# Patient Record
Sex: MALE | Race: OTHER | Employment: UNEMPLOYED | URBAN - METROPOLITAN AREA
[De-identification: names, ages, dates, MRNs, and addresses within clinical notes are randomized per-mention and may not be internally consistent; named-entity substitution may affect disease eponyms.]

---

## 2019-09-03 ENCOUNTER — HOSPITAL ENCOUNTER (INPATIENT)
Age: 39
LOS: 3 days | Discharge: HOME | DRG: 885 | End: 2019-09-06
Attending: PSYCHIATRY & NEUROLOGY | Admitting: PSYCHIATRY & NEUROLOGY
Payer: COMMERCIAL

## 2019-09-03 PROBLEM — F32.9 MAJOR DEPRESSIVE DISORDER: Status: ACTIVE | Noted: 2019-09-03

## 2019-09-03 PROCEDURE — 74011250637 HC RX REV CODE- 250/637: Performed by: PSYCHIATRY & NEUROLOGY

## 2019-09-03 PROCEDURE — 65220000003 HC RM SEMIPRIVATE PSYCH

## 2019-09-03 RX ORDER — FOLIC ACID 1 MG/1
1 TABLET ORAL DAILY
Status: DISCONTINUED | OUTPATIENT
Start: 2019-09-03 | End: 2019-09-06 | Stop reason: HOSPADM

## 2019-09-03 RX ORDER — TRAZODONE HYDROCHLORIDE 50 MG/1
50 TABLET ORAL
Status: DISCONTINUED | OUTPATIENT
Start: 2019-09-03 | End: 2019-09-06 | Stop reason: HOSPADM

## 2019-09-03 RX ORDER — LANOLIN ALCOHOL/MO/W.PET/CERES
100 CREAM (GRAM) TOPICAL DAILY
Status: DISCONTINUED | OUTPATIENT
Start: 2019-09-03 | End: 2019-09-06 | Stop reason: HOSPADM

## 2019-09-03 RX ORDER — THERA TABS 400 MCG
1 TAB ORAL DAILY
Status: DISCONTINUED | OUTPATIENT
Start: 2019-09-03 | End: 2019-09-06 | Stop reason: HOSPADM

## 2019-09-03 RX ORDER — LORAZEPAM 2 MG/ML
1 INJECTION INTRAMUSCULAR
Status: DISCONTINUED | OUTPATIENT
Start: 2019-09-03 | End: 2019-09-06 | Stop reason: HOSPADM

## 2019-09-03 RX ORDER — ACETAMINOPHEN 325 MG/1
650 TABLET ORAL
Status: DISCONTINUED | OUTPATIENT
Start: 2019-09-03 | End: 2019-09-06 | Stop reason: HOSPADM

## 2019-09-03 RX ORDER — BENZTROPINE MESYLATE 1 MG/1
1 TABLET ORAL
Status: DISCONTINUED | OUTPATIENT
Start: 2019-09-03 | End: 2019-09-06 | Stop reason: HOSPADM

## 2019-09-03 RX ORDER — HYDROXYZINE 50 MG/1
50 TABLET, FILM COATED ORAL
Status: DISCONTINUED | OUTPATIENT
Start: 2019-09-03 | End: 2019-09-06 | Stop reason: HOSPADM

## 2019-09-03 RX ORDER — NALTREXONE HYDROCHLORIDE 50 MG/1
50 TABLET, FILM COATED ORAL DAILY
Status: DISCONTINUED | OUTPATIENT
Start: 2019-09-04 | End: 2019-09-03

## 2019-09-03 RX ORDER — DIPHENHYDRAMINE HYDROCHLORIDE 50 MG/ML
50 INJECTION, SOLUTION INTRAMUSCULAR; INTRAVENOUS
Status: DISCONTINUED | OUTPATIENT
Start: 2019-09-03 | End: 2019-09-06 | Stop reason: HOSPADM

## 2019-09-03 RX ORDER — HALOPERIDOL 5 MG/ML
5 INJECTION INTRAMUSCULAR
Status: DISCONTINUED | OUTPATIENT
Start: 2019-09-03 | End: 2019-09-06 | Stop reason: HOSPADM

## 2019-09-03 RX ORDER — ADHESIVE BANDAGE
30 BANDAGE TOPICAL DAILY PRN
Status: DISCONTINUED | OUTPATIENT
Start: 2019-09-03 | End: 2019-09-06 | Stop reason: HOSPADM

## 2019-09-03 RX ORDER — PHENOBARBITAL 32.4 MG/1
32.4 TABLET ORAL
Status: DISCONTINUED | OUTPATIENT
Start: 2019-09-03 | End: 2019-09-06 | Stop reason: HOSPADM

## 2019-09-03 RX ORDER — IBUPROFEN 400 MG/1
400 TABLET ORAL
Status: DISCONTINUED | OUTPATIENT
Start: 2019-09-03 | End: 2019-09-06 | Stop reason: HOSPADM

## 2019-09-03 RX ORDER — OLANZAPINE 5 MG/1
5 TABLET ORAL
Status: DISCONTINUED | OUTPATIENT
Start: 2019-09-03 | End: 2019-09-06 | Stop reason: HOSPADM

## 2019-09-03 RX ORDER — SERTRALINE HYDROCHLORIDE 50 MG/1
50 TABLET, FILM COATED ORAL DAILY
Status: DISCONTINUED | OUTPATIENT
Start: 2019-09-03 | End: 2019-09-06 | Stop reason: HOSPADM

## 2019-09-03 RX ORDER — PHENOBARBITAL 64.8 MG/1
64.8 TABLET ORAL 3 TIMES DAILY
Status: DISCONTINUED | OUTPATIENT
Start: 2019-09-03 | End: 2019-09-04

## 2019-09-03 RX ADMIN — Medication 100 MG: at 13:11

## 2019-09-03 RX ADMIN — PHENOBARBITAL 64.8 MG: 32.4 TABLET ORAL at 16:36

## 2019-09-03 RX ADMIN — PHENOBARBITAL 64.8 MG: 32.4 TABLET ORAL at 21:16

## 2019-09-03 RX ADMIN — SERTRALINE HYDROCHLORIDE 50 MG: 50 TABLET ORAL at 13:11

## 2019-09-03 RX ADMIN — PHENOBARBITAL 64.8 MG: 32.4 TABLET ORAL at 09:06

## 2019-09-03 RX ADMIN — FOLIC ACID 1 MG: 1 TABLET ORAL at 13:11

## 2019-09-03 RX ADMIN — THERA TABS 1 TABLET: TAB at 13:11

## 2019-09-03 NOTE — ROUTINE PROCESS
TRANSFER - IN REPORT:    Verbal report received from [facility]) on Hui Apodaca  being received from [facility] ED(unit) for routine progression of care      Report consisted of patients Situation, Background, Assessment and   Recommendations(SBAR). Information from the following report(s) SBAR was reviewed with the receiving nurse. Opportunity for questions and clarification was provided. Assessment completed upon patients arrival to unit and care assumed.

## 2019-09-03 NOTE — BH NOTES
Patient was given food and fluids prior to going to bed and was cooperative with admission process. Sleep hours were 3.0 at this time noted.
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
## 2019-09-03 NOTE — PROGRESS NOTES
Problem: Discharge Planning  Goal: *Discharge to safe environment  Outcome: Progressing Towards Goal  Note:   He is willing to go to the [facility] place shelter   Goal: *Knowledge of medication management  Outcome: Progressing Towards Goal  Note:   Willing to take medications   Goal: *Knowledge of discharge instructions  Outcome: Progressing Towards Goal  Note:   He is able to verbalize discharge instructions

## 2019-09-03 NOTE — PROGRESS NOTES
Admission Medication Reconciliation:    Information obtained from:  Patient interview  RxQuery data available¹:  NO    Comments/Recommendations: Updated PTA meds/reviewed patient's allergies. 1)  Patient denies taking any medications prior to admission. He reports that he never has been on any medications for mental health. 2)  Medication changes (since last review): none    3)  The [place] Prescription Monitoring Program () was assessed to determine fill history of any controlled medications. Unable to locate the patient in the database. ¹RxQuery pharmacy benefit data reflects medications filled and processed through the patient's insurance, however   this data does NOT capture whether the medication was picked up or is currently being taken by the patient. Allergies:  Patient has no known allergies. Significant PMH/Disease States: No past medical history on file. Chief Complaint for this Admission:  No chief complaint on file.     Prior to Admission Medications:   None     ZOHRA Vu

## 2019-09-03 NOTE — BH NOTES
Behavioral Health Interdisciplinary Rounds     Patient Name: Adria Mast  Age: 42 y.o. Room/Bed:  734/  Primary Diagnosis: <principal problem not specified>   Admission Status: TDO     Readmission within 30 days: no  Power of  in place: no  Patient requires a blocked bed: no          Reason for blocked bed:     VTE Prophylaxis: Not indicated    Mobility needs/Fall risk: no  Flu Vaccine : no   Nutritional Plan: no  Consults:          Labs/Testing due today?: admission labs   Sleep hours: new admission        Participation in Care/Groups:    Medication Compliant?: Yes  PRNS (last 24 hours):     Restraints (last 24 hours):  no     CIWA (range last 24 hours): CIWA-Ar Total: 4   COWS (range last 24 hours):      Alcohol screening (AUDIT) completed -   AUDIT Score: 26     If applicable, date SBIRT discussed in treatment team AND documented:   AUDIT Screen Score: AUDIT Score: 26      Document Brief Intervention (corresponds directly with the 5 A's, Ask, Advise, Assess, Assist, and Arrange): At- Risk Patients (Score 7-15 for women; 8-15 for men)  Discuss concern patient is drinking at unhealthy levels known to increase risk of alcohol-related health problems. Is Patient ready to commit to change?  Reports he is wanting help     If Yes:   Set goal- stop drinking    Plan- he is willing to go to the [address] [place] [address] provided    Harmful use or Dependence (Score 16 or greater)   Discuss short term and long term health risks of consuming alcohol   Recommendations- stop drinking and consider a long term treatment program    Negotiate drinking goal- Stop drinking    Recommend addiction specialist/center- [facility]    Arrange follow-up appointments.- go to the [facility] place for intake after discharge     Tobacco - patient is a smoker: Have You Used Tobacco in the Past 30 Days: No  Illegal Drugs use: Have You Used Any Illegal Substances Over the Past 12 Months: No    24 hour chart check complete: new admission     Patient goal(s) for today: meet with treatment team   Treatment team focus/goals: Plan to set up his hearing and assess for medications and discharge needs  Progress note : He was pleasant with treatment team.  Willing to get help     LOS:  0  Expected LOS: TBD    Financial concerns/prescription coverage:  No benefits   Date of last family contact:      Family requesting physician contact today:   Discharge plan: homeless   Guns in the home: no       Outpatient provider(s): [facility]     Participating treatment team members: Ty Joy Dr., RN - Sonja Melendez,PharmD.
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
## 2019-09-03 NOTE — PROGRESS NOTES
Problem: Alcohol Withdrawal  Goal: *STG: Seeks staff when symptoms of withdrawal increase  Outcome: Progressing Towards Goal    Patient remains isolative to room. Responds to questions by HOSP [facility] DR JOANNA VARELA. Indicates he feels better. Denies SI. Goal: *STG: Complies with medication therapy  Outcome: Progressing Towards Goal  Patient medication compliant.

## 2019-09-03 NOTE — BH NOTES
Telephone call to hospitalist on call, Ward Kothari NP for [facility], confirmed. Patient spoken to by Daryl Soto. Laying in bed, does not desire to get up to eat. Reports slight anxiety, reduced from one hour ago, \"feeling better\" reports slight headache, feelings of agitation. Reports that he does not take any medication outside of the hospital. Medication compliant.

## 2019-09-03 NOTE — BH NOTES
[facility] 60  Master Treatment Plan for Keny Cummins    Date Treatment Plan Initiated: 9/3/2019    Treatment Plan Modalities:  Type of Modality Amount  (x minutes) Frequency (x/week) Duration (x days) Name of Responsible Staff   [address] [facility] meetings to encourage peer interactions 15 7 1 C HILL BHT     Group psychotherapy to assist in building coping skills and internal controls 60 7 1 Darin Kirkland   Therapeutic activity groups to build coping skills 60 7 1 Darin Kirkland   Psychoeducation in group setting to address:   Medication education   15 7 1 Tomy 77 RN   Relaxation techniques      SHANNAN Sellers RN   Symptom management      STAFF   Discharge planning   [facility]   60 1 1 volunteer   Recovery/AA/NA      volunteer   Physician medication management   15 7 1 DR SHANNAN ROMERO   Family meeting/discharge planning   15 2 [facility] and Kayla Richter

## 2019-09-03 NOTE — BH NOTES
GROUP THERAPY PROGRESS NOTE    Dufm Cockayne did not participate in an Acute Unit Process Group, with a focus on identifying feelings, planning for the rest of the day, and developing coping skills.

## 2019-09-03 NOTE — BH NOTES
Admission Note:    Patient admitted to [facility] acute unit    Admission Status: TDO [facility])    Reason for Admission: Pt was found at a gas station acting bizarre. A bystander called 911 and he was reportedly heavily intoxicated upon there arrival. Pt has SI with plan to lay in traffic. Pt guarded and was heavily intoxicated trying to leave, and was TDO'd.    UDS neg BAL was 372 then came down to 254    Pt's Condition on Arrival: Pt is guarded and defensive with irritable affect. Despite this, he still cooperated with admission process. He reports Greek is his first language but his english is still good. He currently denies SI/HI and verbally contracts for safety. His hygiene was less than adequate. Pt's Statements/Questions/Concerns: Pt is on CIWA monitoring with scheduled phenobarbital. Pt is homeless. Primary Nurse Sandra Amin and Honey Bacon RN performed a dual skin assessment on this patient Impairment noted- see wound doc flow sheet  Pt has scattered healed scars to back, knees, arms, and elbows that he is unaware of the origin. He also has a lot of self inflicted scars to inner left forearm that are healed and closed. Feet dry and malodorous. David score is 23    Belongings searched by Enriqueta secured properly. See orange slip in paper chart and flowsheet.

## 2019-09-03 NOTE — PROGRESS NOTES
Problem: Alcohol Withdrawal  Goal: *STG: Remains safe in hospital  Outcome: Progressing Towards Goal  Goal: *STG: Seeks staff when symptoms of withdrawal increase  Outcome: Progressing Towards Goal  Goal: *STG: Complies with medication therapy  Outcome: Progressing Towards Goal
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
## 2019-09-04 LAB
ALBUMIN SERPL-MCNC: 3.8 G/DL (ref 3.5–5)
ALBUMIN/GLOB SERPL: 1.1 {RATIO} (ref 1.1–2.2)
ALP SERPL-CCNC: 145 U/L (ref 45–117)
ALT SERPL-CCNC: 120 U/L (ref 12–78)
ANION GAP SERPL CALC-SCNC: 8 MMOL/L (ref 5–15)
AST SERPL-CCNC: 58 U/L (ref 15–37)
BILIRUB SERPL-MCNC: 2.1 MG/DL (ref 0.2–1)
BUN SERPL-MCNC: 8 MG/DL (ref 6–20)
BUN/CREAT SERPL: 10 (ref 12–20)
CALCIUM SERPL-MCNC: 9.1 MG/DL (ref 8.5–10.1)
CHLORIDE SERPL-SCNC: 102 MMOL/L (ref 97–108)
CHOLEST SERPL-MCNC: 181 MG/DL
CO2 SERPL-SCNC: 28 MMOL/L (ref 21–32)
CREAT SERPL-MCNC: 0.84 MG/DL (ref 0.7–1.3)
GLOBULIN SER CALC-MCNC: 3.6 G/DL (ref 2–4)
GLUCOSE P FAST SERPL-MCNC: 105 MG/DL (ref 65–100)
GLUCOSE SERPL-MCNC: 97 MG/DL (ref 65–100)
HDLC SERPL-MCNC: 75 MG/DL
HDLC SERPL: 2.4 {RATIO} (ref 0–5)
LDLC SERPL CALC-MCNC: 84.4 MG/DL (ref 0–100)
LIPID PROFILE,FLP: NORMAL
POTASSIUM SERPL-SCNC: 3.7 MMOL/L (ref 3.5–5.1)
PROT SERPL-MCNC: 7.4 G/DL (ref 6.4–8.2)
SODIUM SERPL-SCNC: 138 MMOL/L (ref 136–145)
TRIGL SERPL-MCNC: 108 MG/DL (ref ?–150)
TSH SERPL DL<=0.05 MIU/L-ACNC: 2.68 UIU/ML (ref 0.36–3.74)
VLDLC SERPL CALC-MCNC: 21.6 MG/DL

## 2019-09-04 PROCEDURE — 65220000003 HC RM SEMIPRIVATE PSYCH

## 2019-09-04 PROCEDURE — 36415 COLL VENOUS BLD VENIPUNCTURE: CPT

## 2019-09-04 PROCEDURE — 82947 ASSAY GLUCOSE BLOOD QUANT: CPT

## 2019-09-04 PROCEDURE — 80061 LIPID PANEL: CPT

## 2019-09-04 PROCEDURE — 74011250637 HC RX REV CODE- 250/637: Performed by: PSYCHIATRY & NEUROLOGY

## 2019-09-04 PROCEDURE — 84443 ASSAY THYROID STIM HORMONE: CPT

## 2019-09-04 PROCEDURE — 80053 COMPREHEN METABOLIC PANEL: CPT

## 2019-09-04 RX ORDER — PHENOBARBITAL 32.4 MG/1
48.6 TABLET ORAL 3 TIMES DAILY
Status: DISCONTINUED | OUTPATIENT
Start: 2019-09-04 | End: 2019-09-05

## 2019-09-04 RX ADMIN — PHENOBARBITAL 64.8 MG: 32.4 TABLET ORAL at 08:34

## 2019-09-04 RX ADMIN — PHENOBARBITAL 48.6 MG: 32.4 TABLET ORAL at 21:06

## 2019-09-04 RX ADMIN — HYDROXYZINE HYDROCHLORIDE 50 MG: 50 TABLET, FILM COATED ORAL at 08:33

## 2019-09-04 RX ADMIN — ACETAMINOPHEN 650 MG: 325 TABLET, FILM COATED ORAL at 08:33

## 2019-09-04 RX ADMIN — FOLIC ACID 1 MG: 1 TABLET ORAL at 08:34

## 2019-09-04 RX ADMIN — SERTRALINE HYDROCHLORIDE 50 MG: 50 TABLET ORAL at 08:34

## 2019-09-04 RX ADMIN — Medication 100 MG: at 08:34

## 2019-09-04 RX ADMIN — THERA TABS 1 TABLET: TAB at 08:33

## 2019-09-04 RX ADMIN — PHENOBARBITAL 48.6 MG: 32.4 TABLET ORAL at 16:35

## 2019-09-04 NOTE — BH NOTES
GROUP THERAPY PROGRESS NOTE    Eleanor Vazquez did not participate in an Acute Unit Process Group, with a focus on identifying feelings, planning for the rest of the day, and developing coping skills.

## 2019-09-04 NOTE — GROUP NOTE
YANI  GROUP DOCUMENTATION INDIVIDUAL                                                                          Group Therapy Note    Date: September 3    Group Start Time: 2015  Group End Time: 2035  Group Topic: Reflection/Relaxation    [facility] 7W ACUTE BE[facility]    Ashlee Adair 1850 GROUP DOCUMENTATION GROUP    Group Therapy Note         Attendance: Did not attend    Joss Morin

## 2019-09-04 NOTE — BH NOTES
Chief Complaint:  I feel better today. Interval History:  Mr. Tony Gonsales reports feeling \"okay\" today. He remains isolative to his room and says \"I don't like to talk to people when I am depressed\". He has not made any attempt to contact his family and was encouraged to do so. His tremor is much improved as is his appetite. His CIWA was 2 earlier today. Past Medical History:  No past medical history on file. Labs:  No results found for: WBC, WBCLT, HGBPOC, HGB, HGBP, HCTPOC, HCT, PHCT, RBCH, PLT, MCV, HGBEXT, HCTEXT, PLTEXT   Lab Results   Component Value Date/Time    Sodium 138 09/04/2019 06:03 AM    Potassium 3.7 09/04/2019 06:03 AM    Chloride 102 09/04/2019 06:03 AM    CO2 28 09/04/2019 06:03 AM    Anion gap 8 09/04/2019 06:03 AM    Glucose 105 (H) 09/04/2019 06:03 AM    Glucose 97 09/04/2019 06:03 AM    BUN 8 09/04/2019 06:03 AM    Creatinine 0.84 09/04/2019 06:03 AM    BUN/Creatinine ratio 10 (L) 09/04/2019 06:03 AM    GFR est AA >60 09/04/2019 06:03 AM    GFR est non-AA >60 09/04/2019 06:03 AM    Calcium 9.1 09/04/2019 06:03 AM    Bilirubin, total 2.1 (H) 09/04/2019 06:03 AM    AST (SGOT) 58 (H) 09/04/2019 06:03 AM    Alk.  phosphatase 145 (H) 09/04/2019 06:03 AM    Protein, total 7.4 09/04/2019 06:03 AM    Albumin 3.8 09/04/2019 06:03 AM    Globulin 3.6 09/04/2019 06:03 AM    A-G Ratio 1.1 09/04/2019 06:03 AM    ALT (SGPT) 120 (H) 09/04/2019 06:03 AM      Vitals:    09/03/19 1652 09/03/19 2012 09/04/19 0754 09/04/19 1149   BP: (!) 152/97 138/88 (!) 144/98 (!) 155/98   Pulse: 97 77 69 79   Resp: 16 16 16 16   Temp: 98.1 °F (36.7 °C) 98.3 °F (36.8 °C) 98.4 °F (36.9 °C) 98.2 °F (36.8 °C)   SpO2: 100% 96% 99%          Current Facility-Administered Medications   Medication Dose Route Frequency Provider Last Rate Last Dose    OLANZapine (ZyPREXA) tablet 5 mg  5 mg Oral Q6H PRN Yennifer Ko MD        haloperidol lactate (HALDOL) injection 5 mg  5 mg IntraMUSCular Q6H PRN Yennifer Ko MD  benztropine (COGENTIN) tablet 1 mg  1 mg Oral BID PRN King Ellis MD        diphenhydrAMINE (BENADRYL) injection 50 mg  50 mg IntraMUSCular BID PRN King Ellis MD        hydrOXYzine HCl (ATARAX) tablet 50 mg  50 mg Oral TID PRN King Ellis MD   50 mg at 09/04/19 0833    LORazepam (ATIVAN) injection 1 mg  1 mg IntraMUSCular Q4H PRN King Ellis MD        traZODone (DESYREL) tablet 50 mg  50 mg Oral QHS PRN King Ellis MD        acetaminophen (TYLENOL) tablet 650 mg  650 mg Oral Q4H PRN King Ellis MD   650 mg at 09/04/19 0176    magnesium hydroxide (MILK OF MAGNESIA) 400 mg/5 mL oral suspension 30 mL  30 mL Oral DAILY PRN King Ellis MD        ibuprofen (MOTRIN) tablet 400 mg  400 mg Oral Q8H PRN King Ellis MD        PHENobarbital (LUMINAL) tablet 64.8 mg  64.8 mg Oral TID King Ellis MD   64.8 mg at 09/04/19 0834    PHENobarbital (LUMINAL) tablet 32.4 mg  32.4 mg Oral Q6H PRN King Ellis MD        thiamine HCL (B-1) tablet 100 mg  100 mg Oral DAILY Gary Garcia MD   100 mg at 09/04/19 0834    therapeutic multivitamin [facility]) tablet 1 Tab  1 Tab Oral DAILY Gary Garcia MD   1 Tab at 08/02/58 2106    folic acid (FOLVITE) tablet 1 mg  1 mg Oral DAILY Gary Garcia MD   1 mg at 09/04/19 0834    sertraline (ZOLOFT) tablet 50 mg  50 mg Oral DAILY Gary Garcia MD   50 mg at 09/04/19 9502       Mental Status Exam:  Eye contact: fair  Psychomotor activity: WHL  Speech is spontaneous  Mood is \"down\"  Affect: flat  Perception: Denies any AH or VH. Suicidal ideation:  Denies any SI or plan. Cognition is grossly intact     Physical Exam:  Body habitus: well built and nourished  Musculoskeletal system:  Normal gait  Tremor is not present  Cog wheeling is not present. Assessment and Plan:  Julia Altamirano meets criteria for a diagnosis of Mood NOS, ETOH use disorder. Taper phenobarb.    Continue the medication regimen as prescribed  Disposition planning to continue. I certify that this patients inpatient psychiatric hospital services furnished since the previous certification were, and continue to be, required for treatment that could reasonably be expected to improve the patient's condition, or for diagnostic study, and that the patient continues to need, on a daily basis, active treatment furnished directly by or requiring the supervision of inpatient psychiatric facility personnel. In addition, the hospital records show that services furnished were intensive treatment services, admission or related services, or equivalent services.

## 2019-09-04 NOTE — INTERDISCIPLINARY ROUNDS
Behavioral Health Interdisciplinary Rounds     Patient Name: Debora Steel  Age: 42 y.o. Room/Bed:  4/  Primary Diagnosis: <principal problem not specified>   Admission Status: TDO     Readmission within 30 days: no  Power of  in place: no  Patient requires a blocked bed: no          Reason for blocked bed:     VTE Prophylaxis: Not indicated    Mobility needs/Fall risk: no  Flu Vaccine : no   Nutritional Plan: no  Consults:          Labs/Testing due today?: yes    Sleep hours: 8+       Participation in Care/Groups:  no  Medication Compliant?: Yes  PRNS (last 24 hours): None    Restraints (last 24 hours):  no     CIWA (range last 24 hours): CIWA-Ar Total: 2   COWS (range last 24 hours):      Alcohol screening (AUDIT) completed -   AUDIT Score: 26     If applicable, date SBIRT discussed in treatment team AND documented:   AUDIT Screen Score: AUDIT Score: 26  Completed on 9/3/2019     Tobacco - patient is a smoker: Have You Used Tobacco in the Past 30 Days: No  Illegal Drugs use: Have You Used Any Illegal Substances Over the Past 12 Months: No    24 hour chart check complete: yes     Patient goal(s) for today:   Treatment team focus/goals: he was voluntarily committed at his hearing.     Progress note : He continues to detox, states he slept well last night, he still having suicidal ideations     LOS:  1  Expected LOS:TBD    Financial concerns/prescription coverage:  No benefits   Date of last family contact:      Family requesting physician contact today:    Discharge plan:he is homeless   Guns in the home:  no       [facility]     Participating treatment team members: Robert Quijano SW- Dr. Loreatha Huff - Bennett Solum, RN

## 2019-09-04 NOTE — PROGRESS NOTES
[address] [facility] 60  Master Treatment Plan for Smiley Scott    Date Treatment Plan Initiated: 09/04/2019    Treatment Plan Modalities:  Type of Modality Amount  (x minutes) Frequency (x/week) Duration (x days) Name of Responsible Staff   Community & wrap-up meetings to encourage peer interactions 15 7 1   MARIYA Lester psychotherapy to assist in building coping skills and internal controls 60 7 1 Darin Kirkland   Therapeutic activity groups to build coping skills 60 7 1 Darin Kirkland   Psychoeducation in group setting to address:   Medication education   15 7 1 [facility] skills      Penny Zamora. BHT   Relaxation techniques      [address] staff   Symptom management      1150 [facility] staff   Discharge planning   60 2 Permanan Brian 115   60 1 1 volunteer   Recovery/AA/NA      volunteer   Physician medication management   15 7 1 Dr. Velez Angry   Family meeting/discharge planning   15 2 1 Marita Landry and Lois Liz                                    Problem: Alcohol Withdrawal  Goal: *STG: Participates in treatment plan  Outcome: Progressing Towards Goal  Goal: *STG: Complies with medication therapy  Outcome: Progressing Towards Goal     Problem: Depressed Mood (Adult/Pediatric)  Goal: *STG: Verbalizes anger, guilt, and other feelings in a constructive manor  Outcome: Progressing Towards Goal    Patient voluntary court ordered admission today following hearing. Present for group and in dayroom for meal and movie. Remains passively engaged when in milieu.

## 2019-09-05 PROCEDURE — 65220000003 HC RM SEMIPRIVATE PSYCH

## 2019-09-05 PROCEDURE — 74011250637 HC RX REV CODE- 250/637: Performed by: PSYCHIATRY & NEUROLOGY

## 2019-09-05 RX ORDER — PHENOBARBITAL 32.4 MG/1
32.4 TABLET ORAL 3 TIMES DAILY
Status: COMPLETED | OUTPATIENT
Start: 2019-09-05 | End: 2019-09-05

## 2019-09-05 RX ADMIN — PHENOBARBITAL 48.6 MG: 32.4 TABLET ORAL at 08:55

## 2019-09-05 RX ADMIN — PHENOBARBITAL 32.4 MG: 32.4 TABLET ORAL at 16:28

## 2019-09-05 RX ADMIN — FOLIC ACID 1 MG: 1 TABLET ORAL at 08:55

## 2019-09-05 RX ADMIN — THERA TABS 1 TABLET: TAB at 08:55

## 2019-09-05 RX ADMIN — Medication 100 MG: at 08:55

## 2019-09-05 RX ADMIN — PHENOBARBITAL 32.4 MG: 32.4 TABLET ORAL at 20:51

## 2019-09-05 RX ADMIN — ACETAMINOPHEN 650 MG: 325 TABLET, FILM COATED ORAL at 20:52

## 2019-09-05 RX ADMIN — SERTRALINE HYDROCHLORIDE 50 MG: 50 TABLET ORAL at 08:55

## 2019-09-05 NOTE — BH NOTES
Chief Complaint:  I feel better today. Interval History:   Magdi Alfaro reports feeling better today. Says he is not feeling shaky today and slept well last night. Denies any SI or plan today. Feels more hopeful about the future and is requesting to be discharged home tomorrow. Pleasant and cheerful. No adverse events events are noted from his medications. Past Medical History:  History reviewed. No pertinent past medical history. Labs:  No results found for: WBC, WBCLT, HGBPOC, HGB, HGBP, HCTPOC, HCT, PHCT, RBCH, PLT, MCV, HGBEXT, HCTEXT, PLTEXT, HGBEXT, HCTEXT, PLTEXT   Lab Results   Component Value Date/Time    Sodium 138 09/04/2019 06:03 AM    Potassium 3.7 09/04/2019 06:03 AM    Chloride 102 09/04/2019 06:03 AM    CO2 28 09/04/2019 06:03 AM    Anion gap 8 09/04/2019 06:03 AM    Glucose 105 (H) 09/04/2019 06:03 AM    Glucose 97 09/04/2019 06:03 AM    BUN 8 09/04/2019 06:03 AM    Creatinine 0.84 09/04/2019 06:03 AM    BUN/Creatinine ratio 10 (L) 09/04/2019 06:03 AM    GFR est AA >60 09/04/2019 06:03 AM    GFR est non-AA >60 09/04/2019 06:03 AM    Calcium 9.1 09/04/2019 06:03 AM    Bilirubin, total 2.1 (H) 09/04/2019 06:03 AM    AST (SGOT) 58 (H) 09/04/2019 06:03 AM    Alk.  phosphatase 145 (H) 09/04/2019 06:03 AM    Protein, total 7.4 09/04/2019 06:03 AM    Albumin 3.8 09/04/2019 06:03 AM    Globulin 3.6 09/04/2019 06:03 AM    A-G Ratio 1.1 09/04/2019 06:03 AM    ALT (SGPT) 120 (H) 09/04/2019 06:03 AM      Vitals:    09/04/19 1602 09/04/19 2032 09/05/19 0820 09/05/19 1151   BP: (!) 146/103 (!) 142/98 (!) 132/93 (!) 148/99   Pulse: 73 68 74 (!) 1   Resp: 18 18 16 16   Temp: 98.1 °F (36.7 °C) 98.2 °F (36.8 °C) 98.3 °F (36.8 °C) 98.2 °F (36.8 °C)   SpO2: 99% 98% 98%          Current Facility-Administered Medications   Medication Dose Route Frequency Provider Last Rate Last Dose    PHENobarbital (LUMINAL) tablet 48.6 mg  48.6 mg Oral TID Surya Tejeda MD   48.6 mg at 09/05/19 0855    OLANZapine (ZyPREXA) tablet 5 mg  5 mg Oral Q6H PRN Javier Pienda MD        haloperidol lactate (HALDOL) injection 5 mg  5 mg IntraMUSCular Q6H PRN Javier Pineda MD        benztropine (COGENTIN) tablet 1 mg  1 mg Oral BID PRN Javier Pineda MD        diphenhydrAMINE (BENADRYL) injection 50 mg  50 mg IntraMUSCular BID PRN Javier Pineda MD        hydrOXYzine HCl (ATARAX) tablet 50 mg  50 mg Oral TID PRN Javier Pineda MD   50 mg at 09/04/19 7850    LORazepam (ATIVAN) injection 1 mg  1 mg IntraMUSCular Q4H PRN Javier Pineda MD        traZODone (DESYREL) tablet 50 mg  50 mg Oral QHS PRN Javier Pineda MD        acetaminophen (TYLENOL) tablet 650 mg  650 mg Oral Q4H PRN Javier Pineda MD   650 mg at 09/04/19 1276    magnesium hydroxide (MILK OF MAGNESIA) 400 mg/5 mL oral suspension 30 mL  30 mL Oral DAILY PRN Javier Pineda MD        ibuprofen (MOTRIN) tablet 400 mg  400 mg Oral Q8H PRN Javier Pineda MD        PHENobarbital (LUMINAL) tablet 32.4 mg  32.4 mg Oral Q6H PRN Javier Pineda MD        thiamine HCL (B-1) tablet 100 mg  100 mg Oral DAILY Jo-Ann Palacios MD   100 mg at 09/05/19 0855    therapeutic multivitamin [facility]) tablet 1 Tab  1 Tab Oral DAILY Jo-Ann Palacios MD   1 Tab at 08/77/38 6794    folic acid (FOLVITE) tablet 1 mg  1 mg Oral DAILY Jo-Ann Palacios MD   1 mg at 09/05/19 0855    sertraline (ZOLOFT) tablet 50 mg  50 mg Oral DAILY Jo-Ann Palacios MD   50 mg at 09/05/19 0855       Mental Status Exam:  Eye contact: fair  Psychomotor activity: WHL  Speech is spontaneous  Mood is \"better\"  Affect: flat  Perception: Denies any AH or VH. Suicidal ideation:  Denies any SI or plan. Cognition is grossly intact     Physical Exam:  Body habitus: well built and nourished  Musculoskeletal system:  Normal gait  Tremor is not present  Cog wheeling is not present. Assessment and Plan:  Ariel Jacobson meets criteria for a diagnosis of Mood NOS, ETOH use disorder. Taper phenobarb. Continue the medication regimen as prescribed  Disposition planning to continue. I certify that this patients inpatient psychiatric hospital services furnished since the previous certification were, and continue to be, required for treatment that could reasonably be expected to improve the patient's condition, or for diagnostic study, and that the patient continues to need, on a daily basis, active treatment furnished directly by or requiring the supervision of inpatient psychiatric facility personnel. In addition, the hospital records show that services furnished were intensive treatment services, admission or related services, or equivalent services.

## 2019-09-05 NOTE — INTERDISCIPLINARY ROUNDS
Behavioral Health Interdisciplinary Rounds     Patient Name: Lidya Newton  Age: 42 y.o. Room/Bed:  734/  Primary Diagnosis: <principal problem not specified>   Admission Status: Voluntary Committed    Readmission within 30 days: no  Power of  in place: no  Patient requires a blocked bed: no          Reason for blocked bed:     VTE Prophylaxis: No    Mobility needs/Fall risk: no  Flu Vaccine : no   Nutritional Plan: no  Consults:          Labs/Testing due today?: no    Sleep hours: 7       Participation in Care/Groups:  no  Medication Compliant?: Yes  PRNS (last 24 hours): Antianxiety    Restraints (last 24 hours):  no     CIWA (range last 24 hours): CIWA-Ar Total: 0   COWS (range last 24 hours):      Alcohol screening (AUDIT) completed -   AUDIT Score: 26     If applicable, date SBIRT discussed in treatment team AND documented:   AUDIT Screen Score: AUDIT Score: 26      Tobacco - patient is a smoker: Have You Used Tobacco in the Past 30 Days: No  Illegal Drugs use: Have You Used Any Illegal Substances Over the Past 12 Months: No    24 hour chart check complete: yes     Patient goal(s) for today:   Treatment team focus/goals: Plan to transfer to the general unit.     Progress note : He has been doing well on the unit    LOS:  2  Expected LOS: TBD    Financial concerns/prescription coverage: no benefits   Family contact:       Family requesting physician contact today:   Discharge plan: homeless   Access to weapons :  no       [facility] provider([facility]   Patient's preferred phone number for follow up call : no phone     Participating treatment team members: Arturo Sheikh Dr., RN

## 2019-09-05 NOTE — BH NOTES
GROUP THERAPY PROGRESS NOTE    Catherine Tang did not participate in an Acute Unit Process Group, with a focus on identifying feelings, planning for the rest of the day, and developing coping skills.

## 2019-09-05 NOTE — PROGRESS NOTES
Patient was to be transfered to General Unit, however patient has been isolating to room and sleeping most of shift. At 2048 patient c/o headache and was given scheduled and PRN medication and went back to room to sleep. Obtained discontinued order to transfer patient. Problem: Alcohol Withdrawal  Goal: *STG: Participates in treatment plan  Outcome: Progressing Towards Goal  Goal: *STG: Remains safe in hospital  Outcome: Progressing Towards Goal  Goal: *STG: Seeks staff when symptoms of withdrawal increase  Outcome: Progressing Towards Goal  Goal: *STG: Complies with medication therapy  Outcome: Progressing Towards Goal     Problem: Falls - Risk of  Goal: *Absence of Falls  Description  Document Dimitri Fall Risk and appropriate interventions in the flowsheet.   Outcome: Progressing Towards Goal  Note:   Fall Risk Interventions:            Medication Interventions: Teach patient to arise slowly

## 2019-09-05 NOTE — PROGRESS NOTES
Problem: Discharge Planning  Goal: *Discharge to safe environment  Outcome: Progressing Towards Goal  Note:   [facility]   Goal: *Knowledge of medication management  Outcome: Progressing Towards Goal  Note:   Complaint with his medications   Goal: *Knowledge of discharge instructions  9/5/2019 1602 by Judith Saunders  Outcome: Progressing Towards Goal  9/5/2019 1600 by Kel BERGMAN  Note:   He is able to verbalize discharge instructions

## 2019-09-05 NOTE — PROGRESS NOTES
Problem: Alcohol Withdrawal  Goal: *STG: Remains safe in hospital  Outcome: Progressing Towards Goal     Pt resting quietly in bed, eyes closed, no apparent distress. CIWA 0. Will continue to monitor.

## 2019-09-05 NOTE — PROGRESS NOTES
Problem: Alcohol Withdrawal  Goal: *STG: Participates in treatment plan  Outcome: Progressing Towards Goal  Note:   CIWA 0      vital signs  within normal limits  met in treatment team  planning discharge tomorrow to [facility]  planning transfer to general unit      Problem: Alcohol Withdrawal  Goal: *STG: Remains safe in hospital  Outcome: Progressing Towards Goal  Note:   Denies suicidal ideation     Problem: Alcohol Withdrawal  Goal: Interventions  Outcome: Progressing Towards Goal  Note:   Will continue to monitor on 15 min.  Checks for safety  assess depression CIWA  medication compliance  effectiveness  encourage unit participation

## 2019-09-05 NOTE — GROUP NOTE
[facility] GROUP DOCUMENTATION INDIVIDUAL                                                                          Group Therapy Note    Date: September 4    Group Start Time: 2000  Group End Time: 2100  Group Topic: Reflection/Relaxation    [address] [facility]    IP [address] GROUP DOCUMENTATION GROUP    Group Therapy Note    Attendees:          Attendance: Attended    Patient's Goal:      Interventions/techniques: Supported    Follows Directions: Other cooperative    Interactions: Interacted appropriately    Mental Status: Calm and Preoccupied    Behavior/appearance: Withdrawn/quiet    Goals Achieved: Able to listen to others      Additional Notes:  Seems in fair spirits though quiet with minimal spontaneous interaction with others.      [facility] [address]

## 2019-09-05 NOTE — PROGRESS NOTES
Problem: Alcohol Withdrawal  Goal: *STG: Participates in treatment plan  Outcome: Progressing Towards Goal  Pt complies with Phenobarbital.  CIWA score #0. Problem: Depressed Mood (Adult/Pediatric)  Goal: *STG: Participates in treatment plan  Outcome: Progressing Towards Goal  Variance Patient slowly responding  Pt's mood is subdued. Pt is polite during exchanges with staff, but offers no personal disclosures.

## 2019-09-06 VITALS
SYSTOLIC BLOOD PRESSURE: 129 MMHG | HEART RATE: 94 BPM | WEIGHT: 190 LBS | RESPIRATION RATE: 16 BRPM | DIASTOLIC BLOOD PRESSURE: 91 MMHG | TEMPERATURE: 98.2 F | BODY MASS INDEX: 25.18 KG/M2 | OXYGEN SATURATION: 99 % | HEIGHT: 73 IN

## 2019-09-06 PROCEDURE — 74011250637 HC RX REV CODE- 250/637: Performed by: PSYCHIATRY & NEUROLOGY

## 2019-09-06 RX ORDER — HYDROXYZINE 50 MG/1
50 TABLET, FILM COATED ORAL
Qty: 90 TAB | Refills: 0 | Status: SHIPPED | OUTPATIENT
Start: 2019-09-06

## 2019-09-06 RX ORDER — SERTRALINE HYDROCHLORIDE 50 MG/1
50 TABLET, FILM COATED ORAL DAILY
Qty: 30 TAB | Refills: 0 | Status: SHIPPED | OUTPATIENT
Start: 2019-09-07

## 2019-09-06 RX ADMIN — THERA TABS 1 TABLET: TAB at 09:22

## 2019-09-06 RX ADMIN — ACETAMINOPHEN 650 MG: 325 TABLET, FILM COATED ORAL at 09:23

## 2019-09-06 RX ADMIN — FOLIC ACID 1 MG: 1 TABLET ORAL at 09:22

## 2019-09-06 RX ADMIN — SERTRALINE HYDROCHLORIDE 50 MG: 50 TABLET ORAL at 09:22

## 2019-09-06 RX ADMIN — Medication 100 MG: at 09:23

## 2019-09-06 NOTE — PROGRESS NOTES
Pharmacist Discharge Medication Reconciliation    Discharging Provider: Kendal Escobar    Significant PMH: History reviewed. No pertinent past medical history. Chief Complaint for this Admission: No chief complaint on file. Allergies: Patient has no known allergies. Discharge Medications:   Current Discharge Medication List        START taking these medications    Details   hydrOXYzine HCl (ATARAX) 50 mg tablet Take 1 Tab by mouth three (3) times daily as needed for Anxiety (Anxiety). Indications: anxious  Qty: 90 Tab, Refills: 0      sertraline (ZOLOFT) 50 mg tablet Take 1 Tab by mouth daily.  Indications: depression  Qty: 30 Tab, Refills: 0           The patient's chart, MAR and AVS were reviewed by Jovita Saldana, RNADELLD.

## 2019-09-06 NOTE — BH NOTES
GROUP THERAPY PROGRESS NOTE    Lilian Lweis did not participate in an Acute Unit Process Group, with a focus on identifying feelings, planning for the rest of the day, and developing coping skills. He may have been finalizing his discharge plans with the help of his treatment team, especially nursing.

## 2019-09-06 NOTE — DISCHARGE INSTRUCTIONS
DISCHARGE SUMMARY    NAME:Deyvi Barnes  : 1980  MRN: [id number]    The patient Bishop Alfaro exhibits the ability to control behavior in a less restrictive environment. Patient's level of functioning is improving. No assaultive/destructive behavior has been observed for the past 24 hours. No suicidal/homicidal threat or behavior has been observed for the past 24 hours. There is no evidence of serious medication side effects. Patient has not been in physical or protective restraints for at least the past 24 hours. If weapons involved, how are they secured? No weapons involved     Is patient aware of and in agreement with discharge plan? Patient is aware of discharge and is in agreement. Arrangements for medication:  Prescriptions filled at [facility] . Copy of discharge instructions to provider?:  Yes fax to the [facility]     Arrangements for transportation home:  Lyft to [facility]     Keep all follow up appointments as scheduled, continue to take prescribed medications per physician instructions. Mental health crisis number:  [phone] or your local mental health crisis line number at [address] from Nurse    PATIENT INSTRUCTIONS:    What to do at Home:  Recommended activity: Activity as tolerated,     If you experience any of the following symptoms feelings of hurting yourself or someone else,  hopelessness, helplessness, please follow up with 911 or your local mental health crisis line number at [phone]. *  Please give a list of your current medications to your Primary Care Provider. *  Please update this list whenever your medications are discontinued, doses are      changed, or new medications (including over-the-counter products) are added. *  Please carry medication information at all times in case of emergency situations.     These are general instructions for a healthy lifestyle:    No smoking/ No tobacco products/ Avoid exposure to second hand smoke  Surgeon General's Warning:  Quitting smoking now greatly reduces serious risk to your health. Obesity, smoking, and sedentary lifestyle greatly increases your risk for illness    A healthy diet, regular physical exercise & weight monitoring are important for maintaining a healthy lifestyle    You may be retaining fluid if you have a history of heart failure or if you experience any of the following symptoms:  Weight gain of 3 pounds or more overnight or 5 pounds in a week, increased swelling in our hands or feet or shortness of breath while lying flat in bed. Please call your doctor as soon as you notice any of these symptoms; do not wait until your next office visit. The discharge information has been reviewed with the patient. The patient verbalized understanding. Discharge medications reviewed with the patient and appropriate educational materials and side effects teaching were provided.   ___________________________________________________________________________________________________________________________________

## 2019-09-06 NOTE — BH NOTES
PRN Medication Documentation    Specific patient behavior that led to need for PRN medication: c/o headache, rating 6/10  Staff interventions attempted prior to PRN being given: dim lights, ice pack  PRN medication given: tylenol 650 mg  Patient response/effectiveness of PRN medication: will continue to monitor

## 2019-09-06 NOTE — PROGRESS NOTES
Patient has been fall free since arriving on the Acute BH. Unit. He is currently sleeping. No stress noted.

## 2019-09-06 NOTE — INTERDISCIPLINARY ROUNDS
Behavioral Health Interdisciplinary Rounds     Patient Name: Felicity Shaw  Age: 42 y.o.   Room/Bed:  734/  Primary Diagnosis: <principal problem not specified>   Admission Status: Voluntary     Readmission within 30 days: no  Power of  in place: no  Patient requires a blocked bed: no          Reason for blocked bed:     VTE Prophylaxis: No    Mobility needs/Fall risk: no  Flu Vaccine : no   Nutritional Plan: no  Consults:          Labs/Testing due today?: no    Sleep hours:  8.30      Participation in Care/Groups:  yes  Medication Compliant?: Yes  PRNS (last 24 hours): None    Restraints (last 24 hours):  no     CIWA (range last 24 hours): CIWA-Ar Total: 2   COWS (range last 24 hours):      Alcohol screening (AUDIT) completed -   AUDIT Score: 26     If applicable, date SBIRT discussed in treatment team AND documented:   AUDIT Screen Score: AUDIT Score: 26        Document Brief Intervention  - Completed on 9/3/2019     Tobacco - patient is a smoker: Have You Used Tobacco in the Past 30 Days: No  Illegal Drugs use: Have You Used Any Illegal Substances Over the Past 12 Months: No    24 hour chart check complete: yes     Patient goal(s) for today:   Treatment team focus/goals: Plan to discharge to the [facility]   Progress note : he has been pleasant and compliant with his treatment     LOS:  2  Expected LOS: today     Financial concerns/prescription coverage:  No benefits   Family contact:refused family contact       Family requesting physician contact today:  no  Discharge plan: homeless - referred to the [facility]   Access to weapons : no        Outpatient provider(s): referred to [facility]   Patient's preferred phone number for follow up call : no phone     Participating treatment team members: Felicity Shaw,  Roel Chung, RN

## 2019-09-06 NOTE — DISCHARGE SUMMARY
Some parts of the discharge summary are from the initial Psychiatric interview that was done on admission by the admitting psychiatrist.      Date of Admission: 9/3/2019    Date of Discharge:9/6/2019     TYPE OF DISCHARGE:   REGULAR -  YES  AMA  RELEASED BY THE TDO COURT     CHIEF COMPLAINT:  \"I was drinking too much. \"     HISTORY OF PRESENT ILLNESS:  The patient is a 57-year-old  male of [place] origin who is currently admitted with a history of being transferred to us from [facility].  He was found in an intoxicated state at a gas station and a passerby called 911. He had been acting bizarrely at that time. States that he has been drinking heavily and estimates that he usually has about 7 mixed drinks almost every day. Blood alcohol level was 372 on admission and fell to 254 after a few hours. Urine drug screen was otherwise negative and he denies using any other substances. States that he has had depression for many years but has never really been in treatment recently. There is a history of cutting, but says he has not cut in 2-1/2 weeks. He used to use cocaine but has not done so in the past year. Notes that he is struggling with sleep, has a little energy or motivation and is unable to tolerate any kind of emotional stress and starts cutting because of that. Denies any history of withdrawal seizures or DTs. Denies any psychotic symptoms at the present time.     PAST MEDICAL HISTORY:  Reviewed as per the history and physical exam.     No past medical history on file.    Prior to Admission medications    Not on File    No results found for: WBC, WBCLT, HGBPOC, HGB, HGBP, HCTPOC, HCT, PHCT, RBCH, PLT, MCV, HGBEXT, HCTEXT, PLTEXT         Lab Results   Component Value Date/Time     Sodium 138 09/04/2019 06:03 AM     Potassium 3.7 09/04/2019 06:03 AM     Chloride 102 09/04/2019 06:03 AM     CO2 28 09/04/2019 06:03 AM     Anion gap 8 09/04/2019 06:03 AM     Glucose 105 (H) 09/04/2019 06:03 AM     Glucose 97 09/04/2019 06:03 AM     BUN 8 09/04/2019 06:03 AM     Creatinine 0.84 09/04/2019 06:03 AM     BUN/Creatinine ratio 10 (L) 09/04/2019 06:03 AM     GFR est AA >60 09/04/2019 06:03 AM     GFR est non-AA >60 09/04/2019 06:03 AM     Calcium 9.1 09/04/2019 06:03 AM     Bilirubin, total 2.1 (H) 09/04/2019 06:03 AM     AST (SGOT) 58 (H) 09/04/2019 06:03 AM     Alk. phosphatase 145 (H) 09/04/2019 06:03 AM     Protein, total 7.4 09/04/2019 06:03 AM     Albumin 3.8 09/04/2019 06:03 AM     Globulin 3.6 09/04/2019 06:03 AM     A-G Ratio 1.1 09/04/2019 06:03 AM     ALT (SGPT) 120 (H) 09/04/2019 06:03 AM             Vitals:     09/03/19 1111 09/03/19 1652 09/03/19 2012 09/04/19 0754   BP: (!) 153/96 (!) 152/97 138/88 (!) 144/98   Pulse: 68 97 77 69   Resp: 18 16 16 16   Temp: 98.1 °F (36.7 °C) 98.1 °F (36.7 °C) 98.3 °F (36.8 °C) 98.4 °F (36.9 °C)   SpO2: 98% 100% 96% 99%         PSYCHOSOCIAL HISTORY:  The patient is originally from [place] and has been living in [place] for 7 years. He had a very difficult childhood and was abandoned by his family at the age of 12 years. He has never been  but does have a 3month-old son with a woman who lives in [place]. He moved to [place] about 2 years ago. 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 Most recently, he had been working in [facility] for the past 2 or 3 months on construction job but just came back to [place] a few days ago. Denies any legal issues.     MENTAL STATUS EXAMINATION:  The patient is a young  male who is dressed in hospital apparel. He is calm and cooperative and makes good eye contact. Speech is spontaneous and coherent. Affect is reactive and mood is reported as being okay. Denies any active suicidal ideation or plan. Denies any perceptual abnormalities. Denies any delusions. His thought process is logical and goal directed.   Cognitively, he is awake and alert, oriented to time, place, and person. Intelligence is average. Memory is intact and fund of knowledge is adequate. Insight is partial.  Judgment is poor.     ASSESSMENT AND PLAN/DIAGNOSES:  Recurrent major depression, severe without psychotic symptoms; alcohol use disorder, severe; cocaine use disorder, in sustained remission. Course in the Hospital:     Patient was admitted to the inpatient psychiatry unit for acute psychiatric stabilization in regards to symptomatology as described in the HPI above and placed on Q15 minute checks and withdrawal precautions. While on the unit Esa Bryant was involved in individual, group, occupational and milieu therapy. He was started back on his usual medication regimen as well as PRN medications including Phenobarbitone for detox and Zoloft for his depression. He improved gradually and was able to integrate into the milieu with help from the nursing staff. Patients symptoms improved gradually including SI, poor energy. Low motivation. He was quite on the unit, appropriate in his interactions, and cooperative with medications and the unit routine. Please see individual progress notes for more specific details regarding patient's hospitalization course. Patient was discharged as per the plan. He had been doing well on the unit as per the report of the nursing staff and my observations. No PRN medication for agitation, seclusion or restraints were required during the last 48 hours of her stay. Esa Bryant had improved progressively to the point of being stable for discharge and outpatient FU. At this time he did not offer any complaints. Patient denied any SI or HI. Denied any AH or VH. He denied any delusions. Was not considered a danger to self or to others and is safe for discharge. Will FU with his appointments and remains motivated to be in treatment. The patient verbalized understanding of his discharge instructions.         DISCHARGE DIAGNOSIS:  Recurrent MDD, Cocaine and Alcohol use disorder. MENTAL STATUS EXAM ON DISCHARGE:  General appearance:   Adria Mast is a 42 y.o. OTHER male who is well groomed, psychomotor activity is WNL  Eye contact: makes good eye contact  Speech: Spontaneous and coherent  Affect : Euthymic  Mood: \"OK\"  Thought Process: Logical, goal directed  Perception: Denies any AH or VH. Thought Content: Denies any SI or Plan  Insight: Partial  Judgement: Fair  Cognition: Intact grossly. Current Discharge Medication List      START taking these medications    Details   hydrOXYzine HCl (ATARAX) 50 mg tablet Take 1 Tab by mouth three (3) times daily as needed for Anxiety (Anxiety). Indications: anxious  Qty: 90 Tab, Refills: 0      sertraline (ZOLOFT) 50 mg tablet Take 1 Tab by mouth daily. Indications: depression  Qty: 30 Tab, Refills: 0              Follow-up Information     Follow up With Specialties Details Why Contact Info    [facility]   On 9/6/2019 opeb 24/7- 365 for men seeking shelter  [address] [place] [address], [address] [place] [address]     [facility]  On 9/9/2019 Initial Mental Health assessment Monday , Wednesdya and Thursday arrive before 8:30 am / doors open at 7:30  [facility] [id number]    None    None (395) Patient stated that they have no PCP          WOUND CARE: none needed. PROGNOSIS:   Good / Fair based on nature of patient's pathology/ies and treatment compliance issues. Prognosis is greatly dependent upon patient's ability to  follow up on psychiatric/psychotherapy appointments as well as to comply with psychiatric medications as prescribed.

## 2019-09-06 NOTE — PROGRESS NOTES
Problem: Alcohol Withdrawal  Goal: *STG: Participates in treatment plan  Outcome: Progressing Towards Goal  Patient CIWA score remains is zero. Goal: *STG: Complies with medication therapy  Outcome: Progressing Towards Goal  Patient is medication compliant     Problem: Depressed Mood (Adult/Pediatric)  Goal: *STG: Verbalizes anger, guilt, and other feelings in a constructive manor  Outcome: Progressing Towards Goal  Patient is calm and passively engaged in unit. Answers questions when spoken to but does not freely engage in conversation with others. Denies SI.  Mood is calm, affect congruent with mood

## 2019-09-06 NOTE — BH NOTES
Behavioral Health Transition Record to Provider    Patient Name: Barbara Clarke  YOB: 1980  Medical Record Number: [id number]  Date of Admission: 9/3/2019  Date of Discharge: 9/6/20109     Attending Provider: Alondra Andrews MD  Discharging Provider:Dr. Mike Childress   To contact this individual call [phone] and ask the  to page. If unavailable, ask to be transferred to [address] [place] [address] Provider on call. [facility] Provider will be available on call 24/7 and during holidays. Primary Care Provider: None    No Known Allergies    Reason for Admission: CHIEF COMPLAINT:  \"I was drinking too much. \"     HISTORY OF PRESENT ILLNESS:  The patient is a 68-year-old  male of [place] origin who is currently admitted with a history of being transferred to us from [facility] was found in an intoxicated state at a gas station and a passerby called 46. Marisa Henry had been acting bizarrely at that time.  States that he has been drinking heavily and estimates that he usually has about 7 mixed drinks almost every day.  Blood alcohol level was 372 on admission and fell to 254 after a few hours.      Admission Diagnosis: Major depressive disorder [F32.9]    * No surgery found *    Results for orders placed or performed during the hospital encounter of 09/03/19   GLUCOSE, FASTING   Result Value Ref Range    Glucose 105 (H) 65 - 100 MG/DL   TSH 3RD GENERATION   Result Value Ref Range    TSH 2.68 0.36 - 3.74 uIU/mL   LIPID PANEL   Result Value Ref Range    LIPID PROFILE          Cholesterol, total 181 <200 MG/DL    Triglyceride 108 <150 MG/DL    HDL Cholesterol 75 MG/DL    LDL, calculated 84.4 0 - 100 MG/DL    VLDL, calculated 21.6 MG/DL    CHOL/HDL Ratio 2.4 0.0 - 5.0     METABOLIC PANEL, COMPREHENSIVE   Result Value Ref Range    Sodium 138 136 - 145 mmol/L    Potassium 3.7 3.5 - 5.1 mmol/L    Chloride 102 97 - 108 mmol/L    CO2 28 21 - 32 mmol/L    Anion gap 8 5 - 15 mmol/L    Glucose 97 65 - 100 mg/dL    BUN 8 6 - 20 MG/DL    Creatinine 0.84 0.70 - 1.30 MG/DL    BUN/Creatinine ratio 10 (L) 12 - 20      GFR est AA >60 >60 ml/min/1.73m2    GFR est non-AA >60 >60 ml/min/1.73m2    Calcium 9.1 8.5 - 10.1 MG/DL    Bilirubin, total 2.1 (H) 0.2 - 1.0 MG/DL    ALT (SGPT) 120 (H) 12 - 78 U/L    AST (SGOT) 58 (H) 15 - 37 U/L    Alk. phosphatase 145 (H) 45 - 117 U/L    Protein, total 7.4 6.4 - 8.2 g/dL    Albumin 3.8 3.5 - 5.0 g/dL    Globulin 3.6 2.0 - 4.0 g/dL    A-G Ratio 1.1 1.1 - 2.2         Immunizations administered during this encounter: There is no immunization history on file for this patient. Screening for Metabolic Disorders for Patients on Antipsychotic Medications  (Data obtained from the EMR)    Estimated Body Mass Index  Estimated body mass index is 25.07 kg/m² as calculated from the following:    Height as of this encounter: 6' 1\" (1.854 m). Weight as of this encounter: 86.2 kg (190 lb). Vital Signs/Blood Pressure  Visit Vitals  BP (!) 129/91 (BP 1 Location: Right arm, BP Patient Position: Sitting)   Pulse 94   Temp 98.2 °F (36.8 °C)   Resp 16   Ht 6' 1\" (1.854 m)   Wt 86.2 kg (190 lb)   SpO2 99%   BMI 25.07 kg/m²       Blood Glucose/Hemoglobin A1c  Lab Results   Component Value Date/Time    Glucose 105 (H) 09/04/2019 06:03 AM    Glucose 97 09/04/2019 06:03 AM       No results found for: HBA1C, HGBE8, RGE0RUXE     Lipid Panel  Lab Results   Component Value Date/Time    Cholesterol, total 181 09/04/2019 06:03 AM    HDL Cholesterol 75 09/04/2019 06:03 AM    LDL, calculated 84.4 09/04/2019 06:03 AM    Triglyceride 108 09/04/2019 06:03 AM    CHOL/HDL Ratio 2.4 09/04/2019 06:03 AM        Discharge Diagnosis:   Recurrent MDD, Cocaine and Alcohol use disorder. Discharge Plan: discharge to the [facility] [address]     The patient Felicity Shaw exhibits the ability to control behavior in a less restrictive environment. Patient's level of functioning is improving.   No assaultive/destructive behavior has been observed for the past 24 hours. No suicidal/homicidal threat or behavior has been observed for the past 24 hours. There is no evidence of serious medication side effects. Patient has not been in physical or protective restraints for at least the past 24 hours. If weapons involved, how are they secured? No weapons involved     Is patient aware of and in agreement with discharge plan? Patient is aware of discharge and is in agreement. Arrangements for medication:  Prescriptions filled at [facility] . Copy of discharge instructions to provider?:  Yes fax to the [facility]     Arrangements for transportation home:  Lyft to [facility]     Keep all follow up appointments as scheduled, continue to take prescribed medications per physician instructions. Mental health crisis number:  706 or your local mental health crisis line number at [phone]     Discharge Medication List and Instructions:   Current Discharge Medication List      START taking these medications    Details   hydrOXYzine HCl (ATARAX) 50 mg tablet Take 1 Tab by mouth three (3) times daily as needed for Anxiety (Anxiety). Indications: anxious  Qty: 90 Tab, Refills: 0      sertraline (ZOLOFT) 50 mg tablet Take 1 Tab by mouth daily.  Indications: depression  Qty: 30 Tab, Refills: 0             Unresulted Labs (24h ago, onward)    None        To obtain results of studies pending at discharge, please contact [phone]    Follow-up Information     Follow up With Specialties Details Why Contact Info    [facility]   On 9/6/2019 opeb 24/7- 365 for men seeking shelter  [address] [place] [address], [facility]     [facility]  On 9/9/2019 Initial Mental Health assessment Monday , Wednesdya and Thursday arrive before 8:30 am / doors open at 7:30  [id number]    None    None (395) Patient stated that they have no PCP            Advanced Directive:   Does the patient have an appointed surrogate decision maker? No  Does the patient have a Medical Advance Directive? No  Does the patient have a Psychiatric Advance Directive? No  If the patient does not have a surrogate or Medical Advance Directive AND Psychiatric Advance Directive, the patient was offered information on these advance directives Patient declined to complete    Patient Instructions: Please continue all medications until otherwise directed by physician. Tobacco Cessation Discharge Plan:   Is the patient a smoker and needs referral for smoking cessation? No  Patient referred to the following for smoking cessation with an appointment? No     Patient was offered medication to assist with smoking cessation at discharge? No  Was education for smoking cessation added to the discharge instructions? Yes    Alcohol/Substance Abuse Discharge Plan:   Does the patient have a history of substance/alcohol abuse and requires a referral for treatment? Yes  Patient referred to the following for substance/alcohol abuse treatment with an appointment? [facility]- [facility]   Patient was offered medication to assist with alcohol cessation at discharge? No  Was education for substance/alcohol abuse added to discharge instructions? No    Patient discharged to Home; discussed with patient/caregiver and provided to the patient/caregiver either in hard copy or electronically.

## 2019-09-06 NOTE — GROUP NOTE
[facility] GROUP DOCUMENTATION INDIVIDUAL                                                                          Group Therapy Note    Date: September 5    Group Start Time: 2000  Group End Time: 2030  Group Topic: Reflection/Relaxation    [address] [place] [address]    IP [facility] GROUP DOCUMENTATION GROUP    Group Therapy Note    Attendees:        Attendance: Attended    Patient's Goal:  Unit orientation and daily progress    Interventions/techniques: Supported    Follows Directions: Followed directions    Interactions: Other Quiet. little interaction with others though pleasant upon approach    Mental Status: Calm    Behavior/appearance: Cooperative    Goals Achieved: Able to engage in interactions and Able to listen to others      Additional Notes:  Seems in fair spirits. Quiet on unit with little interaction with others.      [facility] [address]